# Patient Record
Sex: MALE | Race: BLACK OR AFRICAN AMERICAN
[De-identification: names, ages, dates, MRNs, and addresses within clinical notes are randomized per-mention and may not be internally consistent; named-entity substitution may affect disease eponyms.]

---

## 2018-12-01 ENCOUNTER — HOSPITAL ENCOUNTER (EMERGENCY)
Dept: HOSPITAL 80 - CED | Age: 23
Discharge: HOME | End: 2018-12-01
Payer: COMMERCIAL

## 2018-12-01 VITALS — DIASTOLIC BLOOD PRESSURE: 75 MMHG | SYSTOLIC BLOOD PRESSURE: 112 MMHG
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
## 2018-12-01 DIAGNOSIS — S63.502A: Primary | ICD-10-CM
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
## 2018-12-01 PROCEDURE — L3807 WHFO W/O JOINTS PRE CST: HCPCS
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
